# Patient Record
Sex: MALE | Race: WHITE | URBAN - METROPOLITAN AREA
[De-identification: names, ages, dates, MRNs, and addresses within clinical notes are randomized per-mention and may not be internally consistent; named-entity substitution may affect disease eponyms.]

---

## 2017-06-16 ENCOUNTER — OFFICE VISIT (OUTPATIENT)
Dept: PEDIATRICS | Facility: CLINIC | Age: 32
End: 2017-06-16
Payer: COMMERCIAL

## 2017-06-16 VITALS
DIASTOLIC BLOOD PRESSURE: 80 MMHG | WEIGHT: 190.6 LBS | SYSTOLIC BLOOD PRESSURE: 110 MMHG | OXYGEN SATURATION: 97 % | HEART RATE: 75 BPM | BODY MASS INDEX: 28.89 KG/M2 | HEIGHT: 68 IN | TEMPERATURE: 98.1 F

## 2017-06-16 DIAGNOSIS — K21.9 GASTROESOPHAGEAL REFLUX DISEASE WITHOUT ESOPHAGITIS: Primary | ICD-10-CM

## 2017-06-16 DIAGNOSIS — G89.29 CHRONIC NONINTRACTABLE HEADACHE, UNSPECIFIED HEADACHE TYPE: ICD-10-CM

## 2017-06-16 DIAGNOSIS — R51.9 CHRONIC NONINTRACTABLE HEADACHE, UNSPECIFIED HEADACHE TYPE: ICD-10-CM

## 2017-06-16 PROCEDURE — 99203 OFFICE O/P NEW LOW 30 MIN: CPT | Performed by: NURSE PRACTITIONER

## 2017-06-16 NOTE — PATIENT INSTRUCTIONS
PLAN:   1.   Symptomatic therapy suggested:   Start on Prilosec   2.  Orders Placed This Encounter   Medications     omeprazole (PRILOSEC) 20 MG CR capsule     Sig: Take 1 capsule (20 mg) by mouth daily     Dispense:  30 capsule     Refill:  1     Orders Placed This Encounter   Procedures     H Pylori antigen, stool     GASTROENTEROLOGY ADULT REF PROCEDURE ONLY     OPTOMETRY REFERRAL     3. Patient needs to follow up in if no improvement,or sooner if worsening of symptoms or other symptoms develop.  CONSULTATION/REFERRAL to Gastroenterology  Please make appointment for eye exam.   Will follow up and/or notify patient on results via phone or mail to determine further need for followup      It was a pleasure seeing you today at the [facility]. Thank you for allowing us to care for you today. We truly hope we provided you with the excellent service you deserve. Please let us know if there is anything else we can do for you so we can be sure you are leaving completley satisfied with your care experience.       General information about your clinic   Clinic Hours Lab Hours (Appointments are required)   Mon-Thurs: 7:30 AM - 7 PM Mon-Thurs: 7:30 AM - 7 PM   Fri: 7:30 AM - 5 PM Fri: 7:30 AM - 5 PM        After Hours Nurse Advise & Appts:  [facility] Nurse Advisors: [phone]  [facility] On Call: to make appointments anytime: [phone] On Call Physician: call [phone] and answering service will page the on call physician.        For urgent appointments, please call [phone] and ask for the triage nurse or your care team clinic nurse.  How to contact my care team:  MyChart: [email or web address]   Phone: [phone]   Fax: [phone]       [facility]:   Phone: [phone]  Hours: 8:00 AM - 6:00 PM  Medication Refills:  Call your pharmacy and they will forward the refill to us. Please allow 3 business days for your refills to be completed.       Normal or non-critical lab  and imaging results will be communicated to you by MyChart, letter or phone within 7 days.  If you do not hear from us within 10 days, please call the clinic. If you have a critical or abnormal lab result, we will notify you by phone as soon as possible.       We now have PWIC (Pediatric Walk in Care)  Monday-Friday from 7:30-4. Simply walk in and be seen for your urgent needs like cough, fever, rash, diarrhea or vomiting, pink eye, UTI. No appointments needed. Ask one of the team for more information      -Your Care Team:    Dr. Arnoldo Prince - Internal Medicine/Pediatrics   Dr. Vidya Thapa - Family Medicine  Dr. Juana Angelo - Pediatrics  Ayla Butt CNP - Family Practice Nurse Practitioner  Dr. Kassandra Brower - Pediatrics  Dr. Wyatt Bhat - Internal Medicine

## 2017-06-16 NOTE — PROGRESS NOTES
SUBJECTIVE:                                                    Indio Ruelas is a 31 year old male who presents to clinic today for the following health issues:    New Patient/Transfer of Care-patient notes has been awhile since he been seen in the clinics.    GERD/Heartburn     Onset: ongoing for 1 year, worst in the last 2-3 months    Description:     Burning in chest: YES    Intensity: moderate, severe    Progression of Symptoms: worsening    Accompanying Signs & Symptoms:  Does it feel like food gets stuck: no  Nausea: no  Vomiting (bloody?): YES- vomiting   Abdominal Pain: no   Black-Tarry stools: no:  Bloody stools: no   History:   Previous ulcers: no    Precipitating factors:   Caffeine use: YES  Alcohol use: no  NSAID/Aspirin use: YES- aleve and Excedrin   Tobacco use: no  Worse with spicy foods and caffeinated drinks.    Alleviating factors:  none         Therapies Tried and outcome: ratinidine, tums     For about a year  Uses Aleve for bad knee and sometimes back  Will use one a day usually   Excedrin uses 2 or 3 times a week about 4 a time.   States he has a high tolerance for medications that's why he does that.   [facility] has headaches for years. Has been seen for this in the past in 2008 while in [place]  Alcohol maybe once a month usually one beer   No recreational drugs   Drinks about 2 caffeinated beverages a day   Problem list and histories reviewed & adjusted, as indicated.  Additional history: as documented    Patient Active Problem List   Diagnosis     Gastroesophageal reflux disease without esophagitis     No past surgical history on file.    Social History   Substance Use Topics     Smoking status: Never Smoker     Smokeless tobacco: Never Used     Alcohol use Yes      Comment: socially     Family History   Problem Relation Age of Onset     Hyperlipidemia Father      DIABETES No family hx of      Coronary Artery Disease No family hx of      Hypertension No family hx of       "CEREBROVASCULAR DISEASE No family hx of      Breast Cancer No family hx of      Colon Cancer No family hx of      Prostate Cancer No family hx of      Other Cancer No family hx of      Depression No family hx of      Anxiety Disorder No family hx of      MENTAL ILLNESS No family hx of      Substance Abuse No family hx of      Anesthesia Reaction No family hx of      Asthma No family hx of      OSTEOPOROSIS No family hx of      Genetic Disorder No family hx of      Thyroid Disease No family hx of      Obesity No family hx of      Unknown/Adopted No family hx of          No current outpatient prescriptions on file.     No Known Allergies    Reviewed and updated as needed this visit by clinical staff  Tobacco  Allergies  Meds  Soc Hx      Reviewed and updated as needed this visit by Provider         ROS:  CONSTITUTIONAL:NEGATIVE for fever, chills, change in weight  ENT/MOUTH: POSITIVE for nasal congestion in the morning  and NEGATIVE for epistaxis, fever and sinus pressure  RESP:NEGATIVE for significant cough or SOB  CV: NEGATIVE for chest pain, palpitations or peripheral edema  GI: POSITIVE for heartburn or reflux and NEGATIVE for hematemesis, hematochezia, jaundice, poor appetite and weight loss  MUSCULOSKELETAL: POSITIVE  for joint pain knees periodically  And workup about 10 years ago and NEGATIVE for joint swelling  and joint warmth   NEURO: POSITIVE for Hx of headaches for years with negative workup. Does have pain behind right eye when they occur   and NEGATIVE for involuntary movements, gait disturbance and paresthesias   ENDOCRINE: NEGATIVE for temperature intolerance, skin/hair changes    OBJECTIVE:                                                    /80 (BP Location: Right arm, Patient Position: Sitting, Cuff Size: Adult Regular)  Pulse 75  Temp 98.1  F (36.7  C) (Temporal)  Ht 5' 7.75\" (1.721 m)  Wt 190 lb 9.6 oz (86.5 kg)  SpO2 97%  BMI 29.19 kg/m2  Body mass index is 29.19 kg/(m^2).  GENERAL " APPEARANCE: alert, active and no distress  HENT: ear canals and TM's normal and nose and mouth without ulcers or lesions  NECK: no adenopathy and thyroid normal to palpation  RESP: lungs clear to auscultation - no rales, rhonchi or wheezes  CV: regular rates and rhythm and no murmur, click or rub  ABDOMEN: soft, nontender, without hepatosplenomegaly or masses and bowel sounds normal  MS: extremities normal- no gross deformities noted  SKIN: no suspicious lesions or rashes  NEURO: Normal strength and tone, mentation intact and speech normal  PSYCH: mentation appears normal and affect normal/bright    Diagnostic Test Results:  Pending      ASSESSMENT/PLAN:                                                      Indio was seen today for heartburn.    Diagnoses and all orders for this visit:    Gastroesophageal reflux disease without esophagitis  -     omeprazole (PRILOSEC) 20 MG CR capsule; Take 1 capsule (20 mg) by mouth daily  -     H Pylori antigen, stool; Future  -     Cancel: GASTROENTEROLOGY ADULT REF PROCEDURE ONLY  -     GASTROENTEROLOGY ADULT REF PROCEDURE ONLY    Chronic nonintractable headache, unspecified headache type  -     OPTOMETRY REFERRAL    PLAN:   1.   Symptomatic therapy suggested:   Start on Prilosec   2.  Orders Placed This Encounter   Medications     omeprazole (PRILOSEC) 20 MG CR capsule     Sig: Take 1 capsule (20 mg) by mouth daily     Dispense:  30 capsule     Refill:  1     Orders Placed This Encounter   Procedures     H Pylori antigen, stool     GASTROENTEROLOGY ADULT REF PROCEDURE ONLY     OPTOMETRY REFERRAL     3. Patient needs to follow up in if no improvement,or sooner if worsening of symptoms or other symptoms develop.  CONSULTATION/REFERRAL to Gastroenterology  Please make appointment for eye exam.   Will follow up and/or notify patient on results via phone or mail to determine further need for followup        See Patient Instructions    REBECA [facility]  [facility]

## 2017-06-16 NOTE — MR AVS SNAPSHOT
After Visit Summary   6/16/2017    Indio Ruelas    MRN: [id number]           Patient Information     Date Of Birth          1985        Visit Information        Provider Department      6/16/2017 8:20 AM Ayla Butt APRN CNP [facility]        Today's Diagnoses     Gastroesophageal reflux disease without esophagitis    -  1    Chronic nonintractable headache, unspecified headache type          Care Instructions    PLAN:   1.   Symptomatic therapy suggested:   Start on Prilosec   2.  Orders Placed This Encounter   Medications     omeprazole (PRILOSEC) 20 MG CR capsule     Sig: Take 1 capsule (20 mg) by mouth daily     Dispense:  30 capsule     Refill:  1     Orders Placed This Encounter   Procedures     H Pylori antigen, stool     GASTROENTEROLOGY ADULT REF PROCEDURE ONLY     OPTOMETRY REFERRAL     3. Patient needs to follow up in if no improvement,or sooner if worsening of symptoms or other symptoms develop.  CONSULTATION/REFERRAL to Gastroenterology  Please make appointment for eye exam.   Will follow up and/or notify patient on results via phone or mail to determine further need for followup      It was a pleasure seeing you today at the [facility]. Thank you for allowing us to care for you today. We truly hope we provided you with the excellent service you deserve. Please let us know if there is anything else we can do for you so we can be sure you are leaving completley satisfied with your care experience.       General information about your clinic   Clinic Hours Lab Hours (Appointments are required)   Mon-Thurs: 7:30 AM - 7 PM Mon-Thurs: 7:30 AM - 7 PM   Fri: 7:30 AM - 5 PM Fri: 7:30 AM - 5 PM        After Hours Nurse Advise & Appts:  [facility] Nurse Advisors: [phone]  [facility] On Call: to make appointments anytime: [phone] On Call Physician: call [phone] and answering service will page the on call physician.         For urgent appointments, please call [phone] and ask for the triage nurse or your care team clinic nurse.  How to contact my care team:  [facility]: [email or web address]   Phone: [phone]   Fax: [phone]       [facility]:   Phone: [phone]  Hours: 8:00 AM - 6:00 PM  Medication Refills:  Call your pharmacy and they will forward the refill to us. Please allow 3 business days for your refills to be completed.       Normal or non-critical lab and imaging results will be communicated to you by MyChart, letter or phone within 7 days.  If you do not hear from us within 10 days, please call the clinic. If you have a critical or abnormal lab result, we will notify you by phone as soon as possible.       We now have PWIC (Pediatric Walk in Care)  Monday-Friday from 7:30-4. Simply walk in and be seen for your urgent needs like cough, fever, rash, diarrhea or vomiting, pink eye, UTI. No appointments needed. Ask one of the team for more information      -Your Care Team:    Dr. Arnoldo Prince - Internal Medicine/Pediatrics   Dr. Vidya Thapa - Family Medicine  Dr. Juana Angelo - Pediatrics  Ayla Butt CNP - Family Practice Nurse Practitioner  Dr. Kassandra Brower - Pediatrics  Dr. Wyatt Bhat - Internal Medicine                      Follow-ups after your visit        Additional Services     GASTROENTEROLOGY ADULT REF PROCEDURE ONLY           OPTOMETRY REFERRAL       Your provider has referred you to: [facility]: [facility] [phone]    [email or web address]    Please be aware that coverage of these services is subject to the terms and limitations of your health insurance plan.  Call member services at your health plan with any benefit or coverage questions.      Please bring the following with you to your appointment:    (1) Any X-Rays, CTs or MRIs which have been performed.  Contact the facility where they were done to arrange for  prior  to your scheduled appointment.    (2) List of current medications  (3) This referral request   (4) Any documents/labs given to you for this referral                  Future tests that were ordered for you today     Open Future Orders        Priority Expected Expires Ordered    H Pylori antigen, stool Routine  2017            Who to contact     If you have questions or need follow up information about today's clinic visit or your schedule please contact [facility] directly at [phone].  Normal or non-critical lab and imaging results will be communicated to you by Ticket[employer]art, letter or phone within 4 business days after the clinic has received the results. If you do not hear from us within 7 days, please contact the clinic through [employer] or phone. If you have a critical or abnormal lab result, we will notify you by phone as soon as possible.  Submit refill requests through [employer] or call your pharmacy and they will forward the refill request to us. Please allow 3 business days for your refill to be completed.          Additional Information About Your Visit        [employer] Information     [employer] is an electronic gateway that provides easy, online access to your medical records. With [employer], you can request a clinic appointment, read your test results, renew a prescription or communicate with your care team.     To sign up for [employer] visit the website at [email or web address]   You will be asked to enter the access code listed below, as well as some personal information. Please follow the directions to create your username and password.     Your access code is: [email or web address]  Expires: 2017  9:04 AM     Your access code will  in 90 days. If you need help or a new code, please contact your [facility] or call [phone] for assistance.        Care EveryWhere ID     This is your Care EveryWhere ID. This could be used by other  "organizations to access your [place] medical records  [id number]        Your Vitals Were     Pulse Temperature Height Pulse Oximetry BMI (Body Mass Index)       75 98.1  F (36.7  C) (Temporal) 5' 7.75\" (1.721 m) 97% 29.19 kg/m2        Blood Pressure from Last 3 Encounters:   06/16/17 110/80    Weight from Last 3 Encounters:   06/16/17 190 lb 9.6 oz (86.5 kg)              We Performed the Following     GASTROENTEROLOGY ADULT REF PROCEDURE ONLY     OPTOMETRY REFERRAL          Today's Medication Changes          These changes are accurate as of: 6/16/17  9:04 AM.  If you have any questions, ask your nurse or doctor.               Start taking these medicines.        Dose/Directions    omeprazole 20 MG CR capsule   Commonly known as:  priLOSEC   Used for:  Gastroesophageal reflux disease without esophagitis   Started by:  Ayla Butt APRN CNP        Dose:  20 mg   Take 1 capsule (20 mg) by mouth daily   Quantity:  30 capsule   Refills:  1            Where to get your medicines      These medications were sent to [id number] - [place], [place] - [address]  [address] [facility] [address]     Phone:  [phone]     omeprazole 20 MG CR capsule                Primary Care Provider    [facility]       No address on file        Thank you!     Thank you for choosing [facility]  for your care. Our goal is always to provide you with excellent care. Hearing back from our patients is one way we can continue to improve our services. Please take a few minutes to complete the written survey that you may receive in the mail after your visit with us. Thank you!             Your Updated Medication List - Protect others around you: Learn how to safely use, store and throw away your medicines at www.disposemymeds.org.          This list is accurate as of: 6/16/17  9:04 AM.  Always use your most recent med list.                   Brand Name " Dispense Instructions for use    omeprazole 20 MG CR capsule    priLOSEC    30 capsule    Take 1 capsule (20 mg) by mouth daily

## 2017-06-16 NOTE — NURSING NOTE
"Chief Complaint   Patient presents with     Heartburn     acid reflux ongoing for the last year, worst in the 2-3 months       Initial /80 (BP Location: Right arm, Patient Position: Sitting, Cuff Size: Adult Regular)  Pulse 75  Temp 98.1  F (36.7  C) (Temporal)  Ht 5' 7.75\" (1.721 m)  Wt 190 lb 9.6 oz (86.5 kg)  SpO2 97%  BMI 29.19 kg/m2 Estimated body mass index is 29.19 kg/(m^2) as calculated from the following:    Height as of this encounter: 5' 7.75\" (1.721 m).    Weight as of this encounter: 190 lb 9.6 oz (86.5 kg).  Medication Reconciliation: complete      NORRIS Morgan      "

## 2017-06-20 ENCOUNTER — OFFICE VISIT (OUTPATIENT)
Dept: OPTOMETRY | Facility: CLINIC | Age: 32
End: 2017-06-20
Attending: NURSE PRACTITIONER
Payer: COMMERCIAL

## 2017-06-20 DIAGNOSIS — H52.02 HYPEROPIA, LEFT: Primary | ICD-10-CM

## 2017-06-20 DIAGNOSIS — R51.9 HEADACHE BEHIND THE EYES: ICD-10-CM

## 2017-06-20 DIAGNOSIS — K21.9 GASTROESOPHAGEAL REFLUX DISEASE WITHOUT ESOPHAGITIS: ICD-10-CM

## 2017-06-20 PROCEDURE — 92015 DETERMINE REFRACTIVE STATE: CPT | Performed by: OPTOMETRIST

## 2017-06-20 PROCEDURE — 92004 COMPRE OPH EXAM NEW PT 1/>: CPT | Performed by: OPTOMETRIST

## 2017-06-20 PROCEDURE — 87338 HPYLORI STOOL AG IA: CPT | Performed by: NURSE PRACTITIONER

## 2017-06-20 ASSESSMENT — SLIT LAMP EXAM - LIDS
COMMENTS: NORMAL
COMMENTS: NORMAL

## 2017-06-20 ASSESSMENT — VISUAL ACUITY
OS_SC: 20/20
OD_SC: 20/20
OD_SC: 20/20
METHOD: SNELLEN - LINEAR
OS_SC: 20/20

## 2017-06-20 ASSESSMENT — CUP TO DISC RATIO
OD_RATIO: 0.3
OS_RATIO: 0.3

## 2017-06-20 ASSESSMENT — TONOMETRY
OS_IOP_MMHG: 17
IOP_METHOD: TONOPEN
OD_IOP_MMHG: 15

## 2017-06-20 ASSESSMENT — CONF VISUAL FIELD
OS_NORMAL: 1
OD_NORMAL: 1
METHOD: COUNTING FINGERS

## 2017-06-20 ASSESSMENT — REFRACTION_MANIFEST
OS_SPHERE: +0.25
OD_CYLINDER: SPHERE
OD_SPHERE: PLANO
OS_CYLINDER: SPHERE

## 2017-06-20 ASSESSMENT — EXTERNAL EXAM - RIGHT EYE: OD_EXAM: NORMAL

## 2017-06-20 ASSESSMENT — EXTERNAL EXAM - LEFT EYE: OS_EXAM: NORMAL

## 2017-06-20 NOTE — MR AVS SNAPSHOT
"              After Visit Summary   6/20/2017    Indio Ruelas    MRN: [id number]           Patient Information     Date Of Birth          1985        Visit Information        Provider Department      6/20/2017 3:00 PM eDvendra Edward OD [facility]        Today's Diagnoses     Hyperopia, left    -  1    Headache behind the eyes          Care Instructions    No glasses recommended.    Follow up with PCP if headache continues.    Return in 1 year for a complete eye exam or sooner if needed.    Devendra Edward OD            Follow-ups after your visit        Follow-up notes from your care team     Return in about 1 year (around 6/20/2018) for Annual Visit.      Your next 10 appointments already scheduled     Jun 27, 2017   Procedure with William Charles Duane, MD   [facility] (--)    [address] [place] [address] [phone]              Who to contact     If you have questions or need follow up information about today's clinic visit or your schedule please contact [facility] directly at [phone].  Normal or non-critical lab and imaging results will be communicated to you by MyChart, letter or phone within 4 business days after the clinic has received the results. If you do not hear from us within 7 days, please contact the clinic through [employer] or phone. If you have a critical or abnormal lab result, we will notify you by phone as soon as possible.  Submit refill requests through [employer] or call your pharmacy and they will forward the refill request to us. Please allow 3 business days for your refill to be completed.          Additional Information About Your Visit        MyChart Information     [employer] lets you send messages to your doctor, view your test results, renew your prescriptions, schedule appointments and more. To sign up, go to [email or web address] . Click on \"Log in\" on the left side of the screen, which will take you " "to the Welcome page. Then click on \"Sign up Now\" on the right side of the page.     You will be asked to enter the access code listed below, as well as some personal information. Please follow the directions to create your username and password.     Your access code is: [email or web address]  Expires: 2017  9:04 AM     Your access code will  in 90 days. If you need help or a new code, please call your [place] clinic or [phone].        Care EveryWhere ID     This is your Care EveryWhere ID. This could be used by other organizations to access your [place] medical records  [id number]         Blood Pressure from Last 3 Encounters:   17 110/80    Weight from Last 3 Encounters:   17 86.5 kg (190 lb 9.6 oz)              We Performed the Following     EYE EXAM (SIMPLE-NONBILLABLE)     REFRACTION        Primary Care Provider    [facility]       No address on file        Thank you!     Thank you for choosing [facility]  for your care. Our goal is always to provide you with excellent care. Hearing back from our patients is one way we can continue to improve our services. Please take a few minutes to complete the written survey that you may receive in the mail after your visit with us. Thank you!             Your Updated Medication List - Protect others around you: Learn how to safely use, store and throw away your medicines at www.disposemymeds.org.          This list is accurate as of: 17  4:01 PM.  Always use your most recent med list.                   Brand Name Dispense Instructions for use    omeprazole 20 MG CR capsule    priLOSEC    30 capsule    Take 1 capsule (20 mg) by mouth daily         "

## 2017-06-20 NOTE — PROGRESS NOTES
Chief Complaint   Patient presents with     COMPREHENSIVE EYE EXAM     was having headaches behind od eye         Last Eye Exam: 10+ years  Dilated Previously: No, side effects of dilation explained today,info given to patient     What are you currently using to see?  does not use glasses or contacts       Distance Vision Acuity: Satisfied with vision    Near Vision Acuity: Satisfied with vision while reading  unaided    Eye Comfort: patient was having headaches behind od eye for 1 year about once a week, has not had a headache for 2 weeks  Do you use eye drops? : No  Occupation or Hobbies: analyst     Mariah Ayala Opt[employer] Assistant, A[employer]          Medical, surgical and family histories reviewed and updated 6/20/2017.       OBJECTIVE: See Ophthalmology exam    ASSESSMENT:    ICD-10-CM    1. Hyperopia, left H52.02 EYE EXAM (SIMPLE-NONBILLABLE)     REFRACTION   2. Headache behind the eyes R51     Not visually significant    PLAN:     Patient Instructions   No glasses recommended.    Follow up with PCP if headache continues.    Return in 1 year for a complete eye exam or sooner if needed.    Devendra Edward, OD

## 2017-06-20 NOTE — PATIENT INSTRUCTIONS
No glasses recommended.    Follow up with PCP if headache continues.    Return in 1 year for a complete eye exam or sooner if needed.    Devendra Edward, OD

## 2017-06-21 LAB
H PYLORI AG STL QL IA: NORMAL
MICRO REPORT STATUS: NORMAL
SPECIMEN SOURCE: NORMAL

## 2017-06-22 ENCOUNTER — TELEPHONE (OUTPATIENT)
Dept: PEDIATRICS | Facility: CLINIC | Age: 32
End: 2017-06-22

## 2017-06-22 PROBLEM — K21.9 GASTROESOPHAGEAL REFLUX DISEASE WITHOUT ESOPHAGITIS: Status: ACTIVE | Noted: 2017-06-22

## 2017-06-22 NOTE — TELEPHONE ENCOUNTER
Patient returned call to clinic. Notified patient of lab result note below. Patient stated understanding.  Shilpa Morocho CMA

## 2017-06-22 NOTE — TELEPHONE ENCOUNTER
Notes Recorded by Ayla Butt APRN CNP on 6/21/2017 at 6:20 PM  Please let patient know H Pylori negative

## 2017-06-27 ENCOUNTER — SURGERY (OUTPATIENT)
Age: 32
End: 2017-06-27

## 2017-06-27 ENCOUNTER — HOSPITAL ENCOUNTER (OUTPATIENT)
Facility: AMBULATORY SURGERY CENTER | Age: 32
Discharge: HOME | End: 2017-06-27
Attending: INTERNAL MEDICINE | Admitting: INTERNAL MEDICINE
Payer: COMMERCIAL

## 2017-06-27 VITALS
BODY MASS INDEX: 29.82 KG/M2 | RESPIRATION RATE: 12 BRPM | SYSTOLIC BLOOD PRESSURE: 129 MMHG | WEIGHT: 190 LBS | DIASTOLIC BLOOD PRESSURE: 89 MMHG | TEMPERATURE: 97.6 F | OXYGEN SATURATION: 95 % | HEIGHT: 67 IN

## 2017-06-27 LAB — UPPER GI ENDOSCOPY: NORMAL

## 2017-06-27 PROCEDURE — 43239 EGD BIOPSY SINGLE/MULTIPLE: CPT

## 2017-06-27 PROCEDURE — 88305 TISSUE EXAM BY PATHOLOGIST: CPT | Performed by: INTERNAL MEDICINE

## 2017-06-27 PROCEDURE — G8907 PT DOC NO EVENTS ON DISCHARG: HCPCS

## 2017-06-27 PROCEDURE — G8918 PT W/O PREOP ORDER IV AB PRO: HCPCS

## 2017-06-27 RX ORDER — ONDANSETRON 2 MG/ML
4 INJECTION INTRAMUSCULAR; INTRAVENOUS
Status: DISCONTINUED | OUTPATIENT
Start: 2017-06-27 | End: 2017-06-28 | Stop reason: HOSPADM

## 2017-06-27 RX ORDER — LIDOCAINE 40 MG/G
CREAM TOPICAL
Status: DISCONTINUED | OUTPATIENT
Start: 2017-06-27 | End: 2017-06-28 | Stop reason: HOSPADM

## 2017-06-27 RX ORDER — FENTANYL CITRATE 50 UG/ML
INJECTION, SOLUTION INTRAMUSCULAR; INTRAVENOUS PRN
Status: DISCONTINUED | OUTPATIENT
Start: 2017-06-27 | End: 2017-06-27 | Stop reason: HOSPADM

## 2017-06-27 RX ADMIN — FENTANYL CITRATE 100 MCG: 50 INJECTION, SOLUTION INTRAMUSCULAR; INTRAVENOUS at 08:55

## 2017-06-27 RX ADMIN — FENTANYL CITRATE 50 MCG: 50 INJECTION, SOLUTION INTRAMUSCULAR; INTRAVENOUS at 09:03

## 2017-06-28 LAB — COPATH REPORT: NORMAL

## 2019-03-12 ENCOUNTER — TELEPHONE (OUTPATIENT)
Dept: OTHER | Facility: CLINIC | Age: 34
End: 2019-03-12

## 2022-07-10 ENCOUNTER — HEALTH MAINTENANCE LETTER (OUTPATIENT)
Age: 37
End: 2022-07-10

## 2022-09-11 ENCOUNTER — HEALTH MAINTENANCE LETTER (OUTPATIENT)
Age: 37
End: 2022-09-11

## 2023-07-29 ENCOUNTER — HEALTH MAINTENANCE LETTER (OUTPATIENT)
Age: 38
End: 2023-07-29

## (undated) DEVICE — SOL WATER IRRIG 1000ML BOTTLE 07139-09